# Patient Record
Sex: FEMALE | Race: WHITE | NOT HISPANIC OR LATINO | URBAN - METROPOLITAN AREA
[De-identification: names, ages, dates, MRNs, and addresses within clinical notes are randomized per-mention and may not be internally consistent; named-entity substitution may affect disease eponyms.]

---

## 2017-12-08 ENCOUNTER — CONVERSION ENCOUNTER (OUTPATIENT)
Dept: GENERAL RADIOLOGY | Facility: HOSPITAL | Age: 62
End: 2017-12-08

## 2018-02-07 ENCOUNTER — OFFICE VISIT CONVERTED (OUTPATIENT)
Dept: ORTHOPEDIC SURGERY | Facility: CLINIC | Age: 63
End: 2018-02-07
Attending: ORTHOPAEDIC SURGERY

## 2018-02-26 ENCOUNTER — OFFICE VISIT CONVERTED (OUTPATIENT)
Dept: FAMILY MEDICINE CLINIC | Facility: CLINIC | Age: 63
End: 2018-02-26
Attending: FAMILY MEDICINE

## 2018-09-21 ENCOUNTER — OFFICE VISIT CONVERTED (OUTPATIENT)
Dept: FAMILY MEDICINE CLINIC | Facility: CLINIC | Age: 63
End: 2018-09-21
Attending: FAMILY MEDICINE

## 2019-01-16 ENCOUNTER — OFFICE VISIT CONVERTED (OUTPATIENT)
Dept: ORTHOPEDIC SURGERY | Facility: CLINIC | Age: 64
End: 2019-01-16
Attending: ORTHOPAEDIC SURGERY

## 2019-02-28 ENCOUNTER — HOSPITAL ENCOUNTER (OUTPATIENT)
Dept: GENERAL RADIOLOGY | Facility: HOSPITAL | Age: 64
Discharge: HOME | End: 2019-02-28
Attending: FAMILY MEDICINE

## 2019-03-21 ENCOUNTER — HOSPITAL ENCOUNTER (OUTPATIENT)
Dept: LAB | Facility: HOSPITAL | Age: 64
Discharge: HOME | End: 2019-03-21
Attending: FAMILY MEDICINE

## 2019-03-21 LAB
25(OH)D3 SERPL-MCNC: 49.4 NG/ML (ref 30–100)
ALBUMIN SERPL-MCNC: 4.5 G/DL (ref 3.5–5)
ALBUMIN/GLOB SERPL: 1.6 {RATIO} (ref 1.4–2.6)
ALP SERPL-CCNC: 49 U/L (ref 43–160)
ALT SERPL-CCNC: 16 U/L (ref 10–40)
ANION GAP SERPL CALC-SCNC: 17 MMOL/L (ref 8–19)
APPEARANCE UR: CLEAR
AST SERPL-CCNC: 17 U/L (ref 15–50)
BASOPHILS # BLD AUTO: 0.02 10*3/UL (ref 0–0.2)
BASOPHILS NFR BLD AUTO: 0.5 % (ref 0–3)
BILIRUB SERPL-MCNC: 0.52 MG/DL (ref 0.2–1.3)
BILIRUB UR QL: NEGATIVE
BUN SERPL-MCNC: 22 MG/DL (ref 5–25)
BUN/CREAT SERPL: 30 {RATIO} (ref 6–20)
CALCIUM SERPL-MCNC: 9.7 MG/DL (ref 8.7–10.4)
CHLORIDE SERPL-SCNC: 107 MMOL/L (ref 99–111)
CHOLEST SERPL-MCNC: 277 MG/DL (ref 107–200)
CHOLEST/HDLC SERPL: 5.1 {RATIO} (ref 3–6)
COLOR UR: YELLOW
CONV ABS IMM GRAN: 0.01 10*3/UL (ref 0–0.2)
CONV CO2: 23 MMOL/L (ref 22–32)
CONV COLLECTION SOURCE (UA): NORMAL
CONV CREATININE URINE, RANDOM: 79.4 MG/DL (ref 10–300)
CONV IMMATURE GRAN: 0.3 % (ref 0–1.8)
CONV MICROALBUM.,U,RANDOM: <12 MG/L (ref 0–20)
CONV TOTAL PROTEIN: 7.3 G/DL (ref 6.3–8.2)
CONV UROBILINOGEN IN URINE BY AUTOMATED TEST STRIP: 0.2 {EHRLICHU}/DL (ref 0.1–1)
CREAT UR-MCNC: 0.73 MG/DL (ref 0.5–0.9)
DEPRECATED RDW RBC AUTO: 44.2 FL (ref 36.4–46.3)
EOSINOPHIL # BLD AUTO: 0.08 10*3/UL (ref 0–0.7)
EOSINOPHIL # BLD AUTO: 2 % (ref 0–7)
ERYTHROCYTE [DISTWIDTH] IN BLOOD BY AUTOMATED COUNT: 12.9 % (ref 11.7–14.4)
EST. AVERAGE GLUCOSE BLD GHB EST-MCNC: 91 MG/DL
GFR SERPLBLD BASED ON 1.73 SQ M-ARVRAT: >60 ML/MIN/{1.73_M2}
GLOBULIN UR ELPH-MCNC: 2.8 G/DL (ref 2–3.5)
GLUCOSE SERPL-MCNC: 96 MG/DL (ref 65–99)
GLUCOSE UR QL: NEGATIVE MG/DL
HBA1C MFR BLD: 13.9 G/DL (ref 12–16)
HBA1C MFR BLD: 4.8 % (ref 3.5–5.7)
HCT VFR BLD AUTO: 43.4 % (ref 37–47)
HDLC SERPL-MCNC: 54 MG/DL (ref 40–60)
HGB UR QL STRIP: NEGATIVE
KETONES UR QL STRIP: NEGATIVE MG/DL
LDLC SERPL CALC-MCNC: 176 MG/DL (ref 70–100)
LEUKOCYTE ESTERASE UR QL STRIP: NEGATIVE
LYMPHOCYTES # BLD AUTO: 1.34 10*3/UL (ref 1–5)
MCH RBC QN AUTO: 29.8 PG (ref 27–31)
MCHC RBC AUTO-ENTMCNC: 32 G/DL (ref 33–37)
MCV RBC AUTO: 93.1 FL (ref 81–99)
MICROALBUMIN/CREAT UR: 15.1 MG/G{CRE} (ref 0–35)
MONOCYTES # BLD AUTO: 0.3 10*3/UL (ref 0.2–1.2)
MONOCYTES NFR BLD AUTO: 7.6 % (ref 3–10)
NEUTROPHILS # BLD AUTO: 2.22 10*3/UL (ref 2–8)
NEUTROPHILS NFR BLD AUTO: 55.8 % (ref 30–85)
NITRITE UR QL STRIP: NEGATIVE
NRBC CBCN: 0 % (ref 0–0.7)
OSMOLALITY SERPL CALC.SUM OF ELEC: 297 MOSM/KG (ref 273–304)
PH UR STRIP.AUTO: 6 [PH] (ref 5–8)
PLATELET # BLD AUTO: 249 10*3/UL (ref 130–400)
PMV BLD AUTO: 9.7 FL (ref 9.4–12.3)
POTASSIUM SERPL-SCNC: 4.5 MMOL/L (ref 3.5–5.3)
PROT UR QL: NEGATIVE MG/DL
RBC # BLD AUTO: 4.66 10*6/UL (ref 4.2–5.4)
SODIUM SERPL-SCNC: 142 MMOL/L (ref 135–147)
SP GR UR: 1.02 (ref 1–1.03)
T4 FREE SERPL-MCNC: 1 NG/DL (ref 0.9–1.8)
TRIGL SERPL-MCNC: 233 MG/DL (ref 40–150)
TSH SERPL-ACNC: 4.33 M[IU]/L (ref 0.27–4.2)
VARIANT LYMPHS NFR BLD MANUAL: 33.8 % (ref 20–45)
VLDLC SERPL-MCNC: 47 MG/DL (ref 5–37)
WBC # BLD AUTO: 3.97 10*3/UL (ref 4.8–10.8)

## 2019-03-22 ENCOUNTER — CONVERSION ENCOUNTER (OUTPATIENT)
Dept: FAMILY MEDICINE CLINIC | Facility: CLINIC | Age: 64
End: 2019-03-22

## 2019-03-22 ENCOUNTER — OFFICE VISIT CONVERTED (OUTPATIENT)
Dept: FAMILY MEDICINE CLINIC | Facility: CLINIC | Age: 64
End: 2019-03-22
Attending: FAMILY MEDICINE

## 2019-05-16 ENCOUNTER — OFFICE VISIT CONVERTED (OUTPATIENT)
Dept: FAMILY MEDICINE CLINIC | Facility: CLINIC | Age: 64
End: 2019-05-16
Attending: NURSE PRACTITIONER

## 2019-06-13 ENCOUNTER — CONVERSION ENCOUNTER (OUTPATIENT)
Dept: OTOLARYNGOLOGY | Facility: CLINIC | Age: 64
End: 2019-06-13

## 2019-06-13 ENCOUNTER — OFFICE VISIT CONVERTED (OUTPATIENT)
Dept: OTOLARYNGOLOGY | Facility: CLINIC | Age: 64
End: 2019-06-13
Attending: OTOLARYNGOLOGY

## 2019-06-25 ENCOUNTER — HOSPITAL ENCOUNTER (OUTPATIENT)
Dept: LAB | Facility: HOSPITAL | Age: 64
Discharge: HOME | End: 2019-06-25
Attending: FAMILY MEDICINE

## 2019-06-25 LAB
BASOPHILS # BLD AUTO: 0.02 10*3/UL (ref 0–0.2)
BASOPHILS NFR BLD AUTO: 0.4 % (ref 0–3)
CONV ABS IMM GRAN: 0.02 10*3/UL (ref 0–0.2)
CONV IMMATURE GRAN: 0.4 % (ref 0–1.8)
DEPRECATED RDW RBC AUTO: 47.7 FL (ref 36.4–46.3)
EOSINOPHIL # BLD AUTO: 0.16 10*3/UL (ref 0–0.7)
EOSINOPHIL # BLD AUTO: 3.3 % (ref 0–7)
ERYTHROCYTE [DISTWIDTH] IN BLOOD BY AUTOMATED COUNT: 14.1 % (ref 11.7–14.4)
HBA1C MFR BLD: 13.7 G/DL (ref 12–16)
HCT VFR BLD AUTO: 43.4 % (ref 37–47)
LYMPHOCYTES # BLD AUTO: 1.78 10*3/UL (ref 1–5)
MCH RBC QN AUTO: 29 PG (ref 27–31)
MCHC RBC AUTO-ENTMCNC: 31.6 G/DL (ref 33–37)
MCV RBC AUTO: 91.8 FL (ref 81–99)
MONOCYTES # BLD AUTO: 0.37 10*3/UL (ref 0.2–1.2)
MONOCYTES NFR BLD AUTO: 7.5 % (ref 3–10)
NEUTROPHILS # BLD AUTO: 2.56 10*3/UL (ref 2–8)
NEUTROPHILS NFR BLD AUTO: 52.1 % (ref 30–85)
NRBC CBCN: 0 % (ref 0–0.7)
PLATELET # BLD AUTO: 217 10*3/UL (ref 130–400)
PMV BLD AUTO: 9.6 FL (ref 9.4–12.3)
RBC # BLD AUTO: 4.73 10*6/UL (ref 4.2–5.4)
VARIANT LYMPHS NFR BLD MANUAL: 36.3 % (ref 20–45)
WBC # BLD AUTO: 4.91 10*3/UL (ref 4.8–10.8)

## 2019-09-23 ENCOUNTER — HOSPITAL ENCOUNTER (OUTPATIENT)
Dept: LAB | Facility: HOSPITAL | Age: 64
Discharge: HOME | End: 2019-09-23
Attending: FAMILY MEDICINE

## 2019-09-23 LAB
25(OH)D3 SERPL-MCNC: 49.8 NG/ML (ref 30–100)
ALBUMIN SERPL-MCNC: 4.4 G/DL (ref 3.5–5)
ALBUMIN/GLOB SERPL: 1.8 {RATIO} (ref 1.4–2.6)
ALP SERPL-CCNC: 48 U/L (ref 43–160)
ALT SERPL-CCNC: 13 U/L (ref 10–40)
ANION GAP SERPL CALC-SCNC: 17 MMOL/L (ref 8–19)
APPEARANCE UR: CLEAR
AST SERPL-CCNC: 16 U/L (ref 15–50)
BASOPHILS # BLD AUTO: 0.01 10*3/UL (ref 0–0.2)
BASOPHILS NFR BLD AUTO: 0.3 % (ref 0–3)
BILIRUB SERPL-MCNC: 0.36 MG/DL (ref 0.2–1.3)
BILIRUB UR QL: NEGATIVE
BUN SERPL-MCNC: 21 MG/DL (ref 5–25)
BUN/CREAT SERPL: 30 {RATIO} (ref 6–20)
CALCIUM SERPL-MCNC: 9.7 MG/DL (ref 8.7–10.4)
CHLORIDE SERPL-SCNC: 104 MMOL/L (ref 99–111)
CHOLEST SERPL-MCNC: 272 MG/DL (ref 107–200)
CHOLEST/HDLC SERPL: 5.3 {RATIO} (ref 3–6)
COLOR UR: YELLOW
CONV ABS IMM GRAN: 0.01 10*3/UL (ref 0–0.2)
CONV CO2: 24 MMOL/L (ref 22–32)
CONV COLLECTION SOURCE (UA): NORMAL
CONV CREATININE URINE, RANDOM: 100.8 MG/DL (ref 10–300)
CONV IMMATURE GRAN: 0.3 % (ref 0–1.8)
CONV MICROALBUM.,U,RANDOM: <12 MG/L (ref 0–20)
CONV TOTAL PROTEIN: 6.8 G/DL (ref 6.3–8.2)
CONV UROBILINOGEN IN URINE BY AUTOMATED TEST STRIP: 0.2 {EHRLICHU}/DL (ref 0.1–1)
CREAT UR-MCNC: 0.69 MG/DL (ref 0.5–0.9)
DEPRECATED RDW RBC AUTO: 43.7 FL (ref 36.4–46.3)
EOSINOPHIL # BLD AUTO: 0.13 10*3/UL (ref 0–0.7)
EOSINOPHIL # BLD AUTO: 3.5 % (ref 0–7)
ERYTHROCYTE [DISTWIDTH] IN BLOOD BY AUTOMATED COUNT: 13.2 % (ref 11.7–14.4)
EST. AVERAGE GLUCOSE BLD GHB EST-MCNC: 108 MG/DL
GFR SERPLBLD BASED ON 1.73 SQ M-ARVRAT: >60 ML/MIN/{1.73_M2}
GLOBULIN UR ELPH-MCNC: 2.4 G/DL (ref 2–3.5)
GLUCOSE SERPL-MCNC: 95 MG/DL (ref 65–99)
GLUCOSE UR QL: NEGATIVE MG/DL
HBA1C MFR BLD: 5.4 % (ref 3.5–5.7)
HCT VFR BLD AUTO: 39.6 % (ref 37–47)
HDLC SERPL-MCNC: 51 MG/DL (ref 40–60)
HGB BLD-MCNC: 13.2 G/DL (ref 12–16)
HGB UR QL STRIP: NEGATIVE
KETONES UR QL STRIP: NEGATIVE MG/DL
LDLC SERPL CALC-MCNC: 170 MG/DL (ref 70–100)
LEUKOCYTE ESTERASE UR QL STRIP: NEGATIVE
LYMPHOCYTES # BLD AUTO: 1.29 10*3/UL (ref 1–5)
LYMPHOCYTES NFR BLD AUTO: 35.1 % (ref 20–45)
MCH RBC QN AUTO: 30.3 PG (ref 27–31)
MCHC RBC AUTO-ENTMCNC: 33.3 G/DL (ref 33–37)
MCV RBC AUTO: 90.8 FL (ref 81–99)
MICROALBUMIN/CREAT UR: 11.9 MG/G{CRE} (ref 0–35)
MONOCYTES # BLD AUTO: 0.32 10*3/UL (ref 0.2–1.2)
MONOCYTES NFR BLD AUTO: 8.7 % (ref 3–10)
NEUTROPHILS # BLD AUTO: 1.91 10*3/UL (ref 2–8)
NEUTROPHILS NFR BLD AUTO: 52.1 % (ref 30–85)
NITRITE UR QL STRIP: NEGATIVE
NRBC CBCN: 0 % (ref 0–0.7)
OSMOLALITY SERPL CALC.SUM OF ELEC: 295 MOSM/KG (ref 273–304)
PH UR STRIP.AUTO: 5.5 [PH] (ref 5–8)
PLATELET # BLD AUTO: 212 10*3/UL (ref 130–400)
PMV BLD AUTO: 9.4 FL (ref 9.4–12.3)
POTASSIUM SERPL-SCNC: 4.3 MMOL/L (ref 3.5–5.3)
PROT UR QL: NEGATIVE MG/DL
RBC # BLD AUTO: 4.36 10*6/UL (ref 4.2–5.4)
SODIUM SERPL-SCNC: 141 MMOL/L (ref 135–147)
SP GR UR: 1.03 (ref 1–1.03)
T4 FREE SERPL-MCNC: 1 NG/DL (ref 0.9–1.8)
TRIGL SERPL-MCNC: 256 MG/DL (ref 40–150)
TSH SERPL-ACNC: 4.77 M[IU]/L (ref 0.27–4.2)
VLDLC SERPL-MCNC: 51 MG/DL (ref 5–37)
WBC # BLD AUTO: 3.67 10*3/UL (ref 4.8–10.8)

## 2019-09-24 ENCOUNTER — HOSPITAL ENCOUNTER (OUTPATIENT)
Dept: FAMILY MEDICINE CLINIC | Facility: CLINIC | Age: 64
Discharge: HOME | End: 2019-09-24
Attending: FAMILY MEDICINE

## 2019-09-24 ENCOUNTER — OFFICE VISIT CONVERTED (OUTPATIENT)
Dept: FAMILY MEDICINE CLINIC | Facility: CLINIC | Age: 64
End: 2019-09-24
Attending: FAMILY MEDICINE

## 2019-09-27 LAB
CONV LAST MENSTURAL PERIOD: NORMAL
SPECIMEN SOURCE: NORMAL
SPECIMEN SOURCE: NORMAL
THIN PREP CVX: NORMAL

## 2019-10-01 ENCOUNTER — HOSPITAL ENCOUNTER (OUTPATIENT)
Dept: GENERAL RADIOLOGY | Facility: HOSPITAL | Age: 64
Discharge: HOME | End: 2019-10-01
Attending: FAMILY MEDICINE

## 2019-11-08 ENCOUNTER — HOSPITAL ENCOUNTER (OUTPATIENT)
Dept: ONCOLOGY | Facility: HOSPITAL | Age: 64
Discharge: HOME | End: 2019-11-08

## 2019-11-08 ENCOUNTER — OFFICE VISIT CONVERTED (OUTPATIENT)
Dept: ONCOLOGY | Facility: HOSPITAL | Age: 64
End: 2019-11-08

## 2019-11-08 LAB
BASOPHILS # BLD AUTO: 0.03 10*3/UL (ref 0–0.2)
BASOPHILS NFR BLD AUTO: 0.7 % (ref 0–3)
CONV ABS IMM GRAN: 0.02 10*3/UL (ref 0–0.2)
CONV IMMATURE GRAN: 0.4 % (ref 0–1.8)
DEPRECATED RDW RBC AUTO: 42.1 FL (ref 36.4–46.3)
EOSINOPHIL # BLD AUTO: 0.1 10*3/UL (ref 0–0.7)
EOSINOPHIL # BLD AUTO: 2.2 % (ref 0–7)
ERYTHROCYTE [DISTWIDTH] IN BLOOD BY AUTOMATED COUNT: 13 % (ref 11.7–14.4)
HCT VFR BLD AUTO: 40 % (ref 37–47)
HGB BLD-MCNC: 14.3 G/DL (ref 12–16)
LYMPHOCYTES # BLD AUTO: 1.47 10*3/UL (ref 1–5)
LYMPHOCYTES NFR BLD AUTO: 32.1 % (ref 20–45)
MCH RBC QN AUTO: 31.6 PG (ref 27–31)
MCHC RBC AUTO-ENTMCNC: 35.8 G/DL (ref 33–37)
MCV RBC AUTO: 88.5 FL (ref 81–99)
MONOCYTES # BLD AUTO: 0.34 10*3/UL (ref 0.2–1.2)
MONOCYTES NFR BLD AUTO: 7.4 % (ref 3–10)
NEUTROPHILS # BLD AUTO: 2.62 10*3/UL (ref 2–8)
NEUTROPHILS NFR BLD AUTO: 57.2 % (ref 30–85)
NRBC CBCN: 0 % (ref 0–0.7)
PLATELET # BLD AUTO: 220 10*3/UL (ref 130–400)
PMV BLD AUTO: 9.1 FL (ref 9.4–12.3)
RBC # BLD AUTO: 4.52 10*6/UL (ref 4.2–5.4)
WBC # BLD AUTO: 4.58 10*3/UL (ref 4.8–10.8)

## 2019-11-10 LAB
DSDNA AB SER-ACNC: NEGATIVE [IU]/ML
ENA AB SER IA-ACNC: NEGATIVE {RATIO}

## 2019-11-19 ENCOUNTER — HOSPITAL ENCOUNTER (OUTPATIENT)
Dept: GENERAL RADIOLOGY | Facility: HOSPITAL | Age: 64
Discharge: HOME | End: 2019-11-19

## 2019-11-21 ENCOUNTER — HOSPITAL ENCOUNTER (OUTPATIENT)
Dept: ONCOLOGY | Facility: HOSPITAL | Age: 64
Discharge: HOME | End: 2019-11-21

## 2019-11-21 ENCOUNTER — OFFICE VISIT CONVERTED (OUTPATIENT)
Dept: ONCOLOGY | Facility: HOSPITAL | Age: 64
End: 2019-11-21

## 2019-12-02 ENCOUNTER — HOSPITAL ENCOUNTER (OUTPATIENT)
Dept: GENERAL RADIOLOGY | Facility: HOSPITAL | Age: 64
Discharge: HOME | End: 2019-12-02

## 2019-12-02 LAB
CREAT BLD-MCNC: 0.7 MG/DL (ref 0.6–1.4)
GFR SERPLBLD BASED ON 1.73 SQ M-ARVRAT: >60 ML/MIN/{1.73_M2}

## 2019-12-03 ENCOUNTER — OFFICE VISIT CONVERTED (OUTPATIENT)
Dept: ONCOLOGY | Facility: HOSPITAL | Age: 64
End: 2019-12-03

## 2019-12-03 ENCOUNTER — HOSPITAL ENCOUNTER (OUTPATIENT)
Dept: ONCOLOGY | Facility: HOSPITAL | Age: 64
Discharge: HOME | End: 2019-12-03

## 2019-12-26 ENCOUNTER — HOSPITAL ENCOUNTER (OUTPATIENT)
Dept: LAB | Facility: HOSPITAL | Age: 64
Discharge: HOME | End: 2019-12-26
Attending: FAMILY MEDICINE

## 2019-12-26 LAB
T4 FREE SERPL-MCNC: 0.9 NG/DL (ref 0.9–1.8)
TSH SERPL-ACNC: 3.98 M[IU]/L (ref 0.27–4.2)

## 2021-05-15 VITALS
HEIGHT: 60 IN | BODY MASS INDEX: 37.98 KG/M2 | HEART RATE: 67 BPM | TEMPERATURE: 96.6 F | WEIGHT: 193.44 LBS | SYSTOLIC BLOOD PRESSURE: 144 MMHG | DIASTOLIC BLOOD PRESSURE: 78 MMHG | OXYGEN SATURATION: 98 %

## 2021-05-15 VITALS
SYSTOLIC BLOOD PRESSURE: 138 MMHG | OXYGEN SATURATION: 98 % | TEMPERATURE: 97.5 F | WEIGHT: 196.06 LBS | DIASTOLIC BLOOD PRESSURE: 84 MMHG | HEART RATE: 61 BPM

## 2021-05-15 VITALS
HEART RATE: 70 BPM | DIASTOLIC BLOOD PRESSURE: 70 MMHG | BODY MASS INDEX: 34.8 KG/M2 | WEIGHT: 189.12 LBS | HEIGHT: 62 IN | OXYGEN SATURATION: 98 % | SYSTOLIC BLOOD PRESSURE: 148 MMHG | RESPIRATION RATE: 18 BRPM | TEMPERATURE: 98 F

## 2021-05-15 VITALS
DIASTOLIC BLOOD PRESSURE: 82 MMHG | TEMPERATURE: 97 F | HEIGHT: 60 IN | OXYGEN SATURATION: 98 % | SYSTOLIC BLOOD PRESSURE: 144 MMHG | HEART RATE: 78 BPM | BODY MASS INDEX: 37.19 KG/M2 | WEIGHT: 189.44 LBS

## 2021-05-16 VITALS
HEART RATE: 69 BPM | SYSTOLIC BLOOD PRESSURE: 140 MMHG | TEMPERATURE: 96.7 F | OXYGEN SATURATION: 98 % | DIASTOLIC BLOOD PRESSURE: 82 MMHG | HEIGHT: 62 IN | BODY MASS INDEX: 34.98 KG/M2 | WEIGHT: 190.06 LBS

## 2021-05-16 VITALS
WEIGHT: 183 LBS | SYSTOLIC BLOOD PRESSURE: 130 MMHG | HEART RATE: 70 BPM | DIASTOLIC BLOOD PRESSURE: 70 MMHG | BODY MASS INDEX: 33.68 KG/M2 | OXYGEN SATURATION: 99 % | HEIGHT: 62 IN | TEMPERATURE: 96.6 F

## 2021-05-16 VITALS — BODY MASS INDEX: 33.04 KG/M2 | OXYGEN SATURATION: 98 % | HEART RATE: 77 BPM | WEIGHT: 175 LBS | HEIGHT: 61 IN

## 2021-05-16 VITALS — BODY MASS INDEX: 37.3 KG/M2 | HEART RATE: 70 BPM | WEIGHT: 190 LBS | OXYGEN SATURATION: 98 % | HEIGHT: 60 IN

## 2021-05-28 VITALS
WEIGHT: 193.12 LBS | WEIGHT: 193.56 LBS | HEIGHT: 62 IN | WEIGHT: 194.22 LBS | HEIGHT: 62 IN | BODY MASS INDEX: 35.54 KG/M2 | TEMPERATURE: 98 F | TEMPERATURE: 97.6 F | HEART RATE: 83 BPM | BODY MASS INDEX: 35.62 KG/M2 | SYSTOLIC BLOOD PRESSURE: 152 MMHG | SYSTOLIC BLOOD PRESSURE: 149 MMHG | HEART RATE: 79 BPM | HEART RATE: 69 BPM | OXYGEN SATURATION: 97 % | DIASTOLIC BLOOD PRESSURE: 71 MMHG | TEMPERATURE: 98.3 F | OXYGEN SATURATION: 96 % | DIASTOLIC BLOOD PRESSURE: 67 MMHG | SYSTOLIC BLOOD PRESSURE: 127 MMHG | HEIGHT: 62 IN | DIASTOLIC BLOOD PRESSURE: 60 MMHG | BODY MASS INDEX: 35.74 KG/M2 | OXYGEN SATURATION: 94 %

## 2021-05-28 NOTE — PROGRESS NOTES
Patient: CARLI RICHARDSON     Acct: [id number]     Report: #[id number]  UNIT #: [id number]     : 1955    Encounter Date:2019  PRIMARY CARE: CAYDEN RAM  ***Signed***  --------------------------------------------------------------------------------------------------------------------  NURSE INTAKE      Visit Type      New Patient Visit            Chief Complaint      DEC. WBC            Referring Provider/Copies To      Referring Provider:  CAYDEN RAM      Primary Care Provider:  CAYDEN RAM            History and Present Illness      HPI - Hematology Interim      63-year-old white female is being consulted for borderline leukopenia white     count around 3.5, absolute neutrophil count over thousand, normal differential     count normal hemoglobin and normal platelets.  This is been going on for the     last 1 year.            PAST, FAMILY   Past Medical History      Past Medical History:  Arthritis, High Cholesterol      Hematology/Oncology (F):  Breast Cancer            Past Surgical History      Fracture Repair, Lumpectomy (LEFT)            Family History      Family History:  Lung Cancer (BROTHER)            Social History      Marital Status:        Lives independently:  Yes      Number of Children:  1            Tobacco Use      Tobacco status:  Never smoker            Alcohol Use      Alcohol intake:  None            Substance Use      Substance use:  Denies use            REVIEW OF SYSTEMS      General:  Denies: Appetite Change, Fatigue, Fever, Night Sweats, Weight Gain,     Weight Loss      Eye:  Denies Blurred Vision, Denies Corrective Lenses, Denies Diplopia, Denies     Vision Changes      ENT:  Denies Headache, Denies Hearing Loss, Denies Hoarseness, Denies Sore     Throat      Cardiovascular:  Denies Chest Pain, Denies Palpitations      Respiratory:  Denies: Coughing Blood, Productive Cough, Shortness of Air,     Wheezing      Gastrointestinal:  Denies Bloody Stools, Denies  Constipation, Denies Diarrhea,     Denies Nausea/Vomiting, Denies Problem Swallowing, Denies Unable to Control     Bowels      Genitourinary:  Denies Blood in Urine, Denies Incontinence, Denies Painful     Urination      Musculoskeletal:  Denies Back Pain, Denies Muscle Pain, Denies Painful Joints      Integumentary:  Denies Itching, Denies Lesions, Denies Rash      Neurologic:  Denies Dizziness, Denies Numbness\Tingling, Denies Seizures      Psychiatric:  Denies Anxiety, Denies Depression      Endocrine:  Denies Cold Intolerance, Denies Heat Intolerance      Hematologic/Lymphatic:  Denies Bruising, Denies Bleeding, Denies Enlarged Lymph     Nodes      Reproductive:  Denies: Menopause, Heavy Periods, Pregnant, Still Menstruating            VITAL SIGNS AND SCORES      Vitals      Height 5 ft 1.81 in / 157.0 cm      Weight 193 lbs 9.022 oz / 87.8 kg      BSA 1.88 m2      BMI 35.6 kg/m2      Temperature 98.3 F / 36.83 C - Temporal      Pulse 79      Blood Pressure 127/60 Sitting, Left Arm      Pulse Oximetry 94%, RM AIR            Pain Score      Experiencing any pain?:  No      Pain Scale Used:  Numerical      Pain Intensity:  0            Fatigue Score      Experiencing any fatigue?:  No      Fatigue (0-10 scale):  0 (none)            EXAM      Other      Perrla: [place] Sclera.      Neck Supple, No adenopathy.      Lungs: Clear to auscultation.      Cardio: Normal Sinus Rythm.      Abdomen: Benign, No Hepato-Splenomegaly.      Extrm: No Cyonosis, clubbing or Edema.      Neuro: Intact. No Sensory Motor deficits.      Joints: Mobile, non swelling, no crepitus.            PREVENTION      Hx Influenza Vaccination:  No      Influenza Vaccine Declined:  No      2 or More Falls Past Year?:  No      Fall Past Year with Injury?:  No      Hx Pneumococcal Vaccination:  Yes      Encouraged to follow-up with:  PCP regarding preventative exams.      Chart initiated by      ARABELLA ALICEA CMA            ALLERGY/MEDS      Allergies       Coded Allergies:             NAPROXEN (Unverified  Allergy, Severe, SWELLING, 11/8/19)           CEPHALEXIN MONOHYDRATE (Unverified  Allergy, Unknown, BLISTERING IN GENITAL    AREA, 11/8/19)           KETAMINE (Verified  Allergy, Unknown, CAUSES HALLUCINATIONS, 11/8/19)           STATINS-HMG-COA REDUCTASE INHIBITOR (Verified  Allergy, Unknown, 11/8/19)           MEPERIDINE HCL (Unverified  Adverse Reaction, Unknown, NAUSEA, 11/8/19)            Medications      Last Reconciled on 11/8/19 14:09 by OH VIDALES MD      Lisinopril-HCTZ 10-12.5 Mg (Lisinopril-HCTZ 10-12.5 Mg) 1 Each Tablet      1 TAB PO QDAY, #30 TAB 0 Refills         Reported         10/10/17      Medications Reviewed:  Changes made to meds            IMPRESSION/PLAN      Diagnosis      Notes      Discontinued Medications      * oxyCODONE-Acetaminophen 7.5-325 Mg 1 EACH TABLET: 1 TAB PO Q3H PRN PAIN #50      * (Oxycodone/Apap 5/325) 1 TAB TAB: 2 TAB PO Q4H PRN SEVERE PAIN (PAIN LEVEL 7-      10) 7 Days #20      New Diagnostics      * CBC With Auto Diff, Routine         Dx: Leukopenia - D72.819      * DANIELE Screen/Reflex Hep Titer, Routine         Dx: Leukopenia - D72.819      * Liver and GB US, SCHEDULED PROCEDURE         Dx: Leukopenia - D72.819      * US Spleen, SCHEDULED PROCEDURE         Dx: Leukopenia - D72.819            Plan      Borderline leukopenia.  I doubt there is any medication causing this.      She tells me that at one time she weighed over 218 pounds.  I suspect that she     may have fatty liver and therefore the flow is directed towards the spleen and     therefore there is splenic sequestration causing borderline leukopenia.      Nevertheless having said above I will still draw an DANIELE.  To confirm above     suspicion that she may have Rodriguez, I am ordering an ultrasound of the liver and     at the same time do a Doppler color flow to check the reversal of flow toward     the spleen as well as check the spleen size.  She will return to  my clinic in     about 10 days time.  Since the absolute neutrophil count is over thousand there     is no need for any fear.  There is no evidence of any abnormal cells seen on the    differential count.            We thank you for allowing us to participate in the management of this patient.            Patient Education      Patient Education Provided:  Yes            Electronically signed by *OH VIDALES  11/08/2019 14:09       Disclaimer: Converted document may not contain table formatting or lab diagrams. Please see [employer] [facility] for the authenticated document.

## 2021-05-28 NOTE — PROGRESS NOTES
Patient: CARLI RICHARDSON     Acct: [id number]     Report: #[id number]  UNIT #: [id number]     : 1955    Encounter Date:2019  PRIMARY CARE: CAYDEN RAM  ***Signed***  --------------------------------------------------------------------------------------------------------------------  NURSE INTAKE      Visit Type      Established Patient Visit            Chief Complaint      DEC. WBC            Referring Provider/Copies To      Referring Provider:  CAYDEN RAM      Primary Care Provider:  CAYDEN RAM            History and Present Illness      HPI - Hematology Interim      63-year-old lady who saw me first time about 2 weeks ago with borderline     leukopenia.  I suspected that she may have Rodriguez, to confirm this I ordered an     ultrasound of the liver and spleen.  The liver ultrasound done last week does     demonstrate multiple target lesions the largest being 5.2 cm and the second     largest being 4.2 cm within the liver.        The MRI of the liver shows 2 benign hemangiomas 7.2 cm and 6 cm.  No malignancy.     The spleen has a cyst.  No abnormality in the rest of the abdomen..            PAST, FAMILY   Past Medical History      Past Medical History:  Arthritis, High Cholesterol, Low WBC Count      Hematology/Oncology (F):  Breast Cancer            Past Surgical History      Fracture Repair, Lumpectomy (LEFT)            Family History      Family History:  Lung Cancer (BROTHER)            Social History      Marital Status:        Lives independently:  Yes      Number of Children:  1            Tobacco Use      Tobacco status:  Never smoker            Alcohol Use      Alcohol intake:  None            Substance Use      Substance use:  Denies use            REVIEW OF SYSTEMS      General:  Denies: Appetite Change, Fatigue, Fever, Night Sweats, Weight Gain,     Weight Loss      Eye:  Denies Blurred Vision, Denies Corrective Lenses, Denies Diplopia, Denies     Vision Changes      ENT:   Denies Headache, Denies Hearing Loss, Denies Hoarseness, Denies Sore     Throat      Cardiovascular:  Denies Chest Pain, Denies Palpitations      Respiratory:  Denies: Coughing Blood, Productive Cough, Shortness of Air, Whee    zing      Gastrointestinal:  Denies Bloody Stools, Denies Constipation, Denies Diarrhea,     Denies Nausea/Vomiting, Denies Problem Swallowing, Denies Unable to Control     Bowels      Genitourinary:  Denies Blood in Urine, Denies Incontinence, Denies Painful     Urination      Musculoskeletal:  Denies Back Pain, Denies Muscle Pain, Denies Painful Joints      Integumentary:  Denies Itching, Denies Lesions, Denies Rash      Neurologic:  Denies Dizziness, Denies Numbness\Tingling, Denies Seizures      Psychiatric:  Denies Anxiety, Denies Depression      Endocrine:  Denies Cold Intolerance, Denies Heat Intolerance      Hematologic/Lymphatic:  Denies Bruising, Denies Bleeding, Denies Enlarged Lymph     Nodes      Reproductive:  Denies: Menopause, Heavy Periods, Pregnant, Still Menstruating            VITAL SIGNS AND SCORES      Vitals      Height 5 ft 1.81 in / 157 cm      Weight 193 lbs 1.967 oz / 87.6 kg      BSA 1.88 m2      BMI 35.5 kg/m2      Temperature 97.6 F / 36.44 C - Temporal      Pulse 83      Blood Pressure 149/71 Sitting, Left Arm      Pulse Oximetry 96%, RM AIR            Pain Score      Experiencing any pain?:  No      Pain Scale Used:  Numerical      Pain Intensity:  0            Fatigue Score      Experiencing any fatigue?:  No      Fatigue (0-10 scale):  0 (none)            EXAM      Perrla: [place] Sclera.      Neck Supple, No adenopathy.      Lungs: Clear to auscultation.      Cardio: Normal Sinus Rythm.      Abdomen: Benign, No Hepato-Splenomegaly.      Extrm: No Cyonosis, clubbing or Edema.      Neuro: Intact. No Sensory Motor deficits.      Joints: Mobile, non swelling, no crepitus.            PREVENTION      Hx Influenza Vaccination:  No      Influenza Vaccine Declined:  Yes       2 or More Falls Past Year?:  No      Fall Past Year with Injury?:  No      Hx Pneumococcal Vaccination:  Yes      Encouraged to follow-up with:  PCP regarding preventative exams.      Chart initiated by      ARABELLA ALICEA [facility]            ALLERGY/MEDS      Allergies      Coded Allergies:             NAPROXEN (Unverified  Allergy, Severe, SWELLING, 12/3/19)           CEPHALEXIN MONOHYDRATE (Unverified  Allergy, Unknown, BLISTERING IN GENITAL    AREA, 12/3/19)           KETAMINE (Verified  Allergy, Unknown, CAUSES HALLUCINATIONS, 12/3/19)           STATINS-HMG-COA REDUCTASE INHIBITOR (Verified  Allergy, Unknown, 12/3/19)           MEPERIDINE HCL (Unverified  Adverse Reaction, Unknown, NAUSEA, 12/3/19)            Medications      Last Reconciled on 12/3/19 08:25 by OH VIDALES MD      Lisinopril-HCTZ 10-12.5 Mg (Lisinopril-HCTZ 10-12.5 Mg) 1 Each Tablet      1 TAB PO QDAY, #30 TAB 0 Refills         Reported         10/10/17      Medications Reviewed:  No Changes made to meds            IMPRESSION/PLAN      Plan      Borderline leukopenia.  Absolute neutrophil count over 1500.  DANIELE negative.  At     this point etiology of this very borderline leukopenia is unclear.  The absolute    neutrophil count is over 1500 therefore this should be no fear of any recurrent     infections.  The liver is good benign hemangiomas      , All the large size 7.2 and 6 cm as well as there is a cyst in the spleen, on     the most recent MRI.  I would continue watchful waiting and observation.  We     thank you very much for this consultation.            , She can return to our clinic on a as needed basis.            Patient Education      Patient Education Provided:  Yes            Electronically signed by *OH VIDALES  12/03/2019 08:25       Disclaimer: Converted document may not contain table formatting or lab diagrams. Please see [employer] [facility] for the authenticated document.

## 2021-05-28 NOTE — PROGRESS NOTES
Patient: CARLI RICHARDSON     Acct: [id number]     Report: #[id number]  UNIT #: [id number]     : 1955    Encounter Date:2019  PRIMARY CARE: CAYDEN RAM  ***Signed***  --------------------------------------------------------------------------------------------------------------------  NURSE INTAKE      Visit Type      Established Patient Visit            Chief Complaint      DEC WBC            Referring Provider/Copies To      Referring Provider:  CAYDEN RAM      Primary Care Provider:  CAYDEN RAM            History and Present Illness      HPI - Hematology Interim      63-year-old lady who saw me first time about 2 weeks ago with borderline     leukopenia.  I suspected that she may have Rodriguez, to confirm this I ordered an     ultrasound of the liver and spleen.  The liver ultrasound done last week does     demonstrate multiple target lesions the largest being 5.2 cm and the second     largest being 4.2 cm within the liver.  The radiologist recommended either a CT     scan or an MRI of the liver.  She is here today for a follow-up.  On my     examination the liver is clinically not enlarged, liver enzymes are normal.      Nevertheless I will order an MRI of the liver and return to clinic soon after     that.            Most Recent Lab Findings      Laboratory Tests      19 14:24            PAST, FAMILY   Past Medical History      Past Medical History:  Arthritis, High Cholesterol, Low WBC Count      Hematology/Oncology (F):  Breast Cancer            Past Surgical History      Fracture Repair, Lumpectomy (LEFT)            Family History      Family History:  Lung Cancer (BROTHER)            Social History      Marital Status:        Lives independently:  Yes      Number of Children:  1            Tobacco Use      Tobacco status:  Never smoker            Alcohol Use      Alcohol intake:  None            Substance Use      Substance use:  Denies use            REVIEW OF SYSTEMS       General:  Denies: Appetite Change, Fatigue, Fever, Night Sweats, Weight Gain,     Weight Loss      Eye:  Denies Blurred Vision, Denies Corrective Lenses, Denies Diplopia, Denies     Vision Changes      ENT:  Denies Headache, Denies Hearing Loss, Denies Hoarseness, Denies Sore Throa    t      Cardiovascular:  Denies Chest Pain, Denies Palpitations      Respiratory:  Denies: Coughing Blood, Productive Cough, Shortness of Air,     Wheezing      Gastrointestinal:  Denies Bloody Stools, Denies Constipation, Denies Diarrhea,     Denies Nausea/Vomiting, Denies Problem Swallowing, Denies Unable to Control     Bowels      Genitourinary:  Denies Blood in Urine, Denies Incontinence, Denies Painful     Urination      Musculoskeletal:  Denies Back Pain, Denies Muscle Pain, Denies Painful Joints      Integumentary:  Denies Itching, Denies Lesions, Denies Rash      Neurologic:  Denies Dizziness, Denies Numbness\Tingling, Denies Seizures      Psychiatric:  Denies Anxiety, Denies Depression      Endocrine:  Denies Cold Intolerance, Denies Heat Intolerance      Hematologic/Lymphatic:  Denies Bruising, Denies Bleeding, Denies Enlarged Lymph     Nodes      Reproductive:  Denies: Menopause, Heavy Periods, Pregnant, Still Menstruating            VITAL SIGNS AND SCORES      Vitals      Height 5 ft 1.81 in / 157 cm      Weight 194 lbs 3.604 oz / 88.1 kg      BSA 1.88 m2      BMI 35.7 kg/m2      Temperature 98.0 F / 36.67 C - Temporal      Pulse 69      Blood Pressure 152/67 Sitting, Left Arm      Pulse Oximetry 97%, RM AIR            Pain Score      Experiencing any pain?:  No      Pain Scale Used:  Numerical      Pain Intensity:  0            Fatigue Score      Experiencing any fatigue?:  No      Fatigue (0-10 scale):  0 (none)            PREVENTION      Hx Influenza Vaccination:  No      Influenza Vaccine Declined:  Yes      2 or More Falls Past Year?:  No      Fall Past Year with Injury?:  No      Hx Pneumococcal Vaccination:  Yes       Encouraged to follow-up with:  PCP regarding preventative exams.      Chart initiated by      ARABELLA ALICEA CMA            ALLERGY/MEDS      Allergies      Coded Allergies:             NAPROXEN (Unverified  Allergy, Severe, SWELLING, 11/21/19)           CEPHALEXIN MONOHYDRATE (Unverified  Allergy, Unknown, BLISTERING IN GENITAL    AREA, 11/21/19)           KETAMINE (Verified  Allergy, Unknown, CAUSES HALLUCINATIONS, 11/21/19)           STATINS-HMG-COA REDUCTASE INHIBITOR (Verified  Allergy, Unknown, 11/21/19)           MEPERIDINE HCL (Unverified  Adverse Reaction, Unknown, NAUSEA, 11/21/19)            Medications      Last Reconciled on 11/21/19 08:31 by OH VIDALES MD      Lisinopril-HCTZ 10-12.5 Mg (Lisinopril-HCTZ 10-12.5 Mg) 1 Each Tablet      1 TAB PO QDAY, #30 TAB 0 Refills         Reported         10/10/17      Medications Reviewed:  No Changes made to meds            IMPRESSION/PLAN      Plan      Borderline Leuokopenia.             DANIELE was reported Nl.            However the ultrasound of the liver shows multiple target lesions the largest of    which is 5.2 cm and the second lesion is 4.2 cm.  The radiologist recommends     further imaging either with CT or an MRI.  She notes that a for an MRI of the     liver.  I will see her soon after the MRI is available.      W/U is still in process.            Patient Education      Patient Education Provided:  Yes            Electronically signed by *OH VIDALES  11/21/2019 08:31       Disclaimer: Converted document may not contain table formatting or lab diagrams. Please see [employer] [facility] for the authenticated document.